# Patient Record
Sex: MALE | Race: BLACK OR AFRICAN AMERICAN | NOT HISPANIC OR LATINO
[De-identification: names, ages, dates, MRNs, and addresses within clinical notes are randomized per-mention and may not be internally consistent; named-entity substitution may affect disease eponyms.]

---

## 2017-10-25 PROBLEM — Z00.00 ENCOUNTER FOR PREVENTIVE HEALTH EXAMINATION: Status: ACTIVE | Noted: 2017-10-25

## 2017-10-26 ENCOUNTER — APPOINTMENT (OUTPATIENT)
Dept: OPHTHALMOLOGY | Facility: CLINIC | Age: 57
End: 2017-10-26
Payer: COMMERCIAL

## 2017-10-26 PROCEDURE — 92014 COMPRE OPH EXAM EST PT 1/>: CPT

## 2017-10-26 PROCEDURE — 92225: CPT | Mod: RT

## 2017-10-26 PROCEDURE — 92015 DETERMINE REFRACTIVE STATE: CPT

## 2017-11-17 ENCOUNTER — APPOINTMENT (OUTPATIENT)
Dept: SLEEP CENTER | Facility: CLINIC | Age: 57
End: 2017-11-17
Payer: COMMERCIAL

## 2017-11-17 PROCEDURE — G0399: CPT | Mod: 26

## 2017-11-19 ENCOUNTER — OUTPATIENT (OUTPATIENT)
Dept: OUTPATIENT SERVICES | Facility: HOSPITAL | Age: 57
LOS: 1 days | End: 2017-11-19
Payer: COMMERCIAL

## 2017-11-19 PROCEDURE — G0399: CPT

## 2017-11-29 DIAGNOSIS — G47.30 SLEEP APNEA, UNSPECIFIED: ICD-10-CM

## 2017-12-05 ENCOUNTER — APPOINTMENT (OUTPATIENT)
Dept: PULMONOLOGY | Facility: CLINIC | Age: 57
End: 2017-12-05
Payer: COMMERCIAL

## 2017-12-05 VITALS
BODY MASS INDEX: 35.31 KG/M2 | HEIGHT: 67 IN | HEART RATE: 84 BPM | WEIGHT: 225 LBS | RESPIRATION RATE: 16 BRPM | SYSTOLIC BLOOD PRESSURE: 150 MMHG | TEMPERATURE: 98.5 F | DIASTOLIC BLOOD PRESSURE: 94 MMHG

## 2017-12-05 DIAGNOSIS — E11.9 TYPE 2 DIABETES MELLITUS W/OUT COMPLICATIONS: ICD-10-CM

## 2017-12-05 DIAGNOSIS — E78.5 HYPERLIPIDEMIA, UNSPECIFIED: ICD-10-CM

## 2017-12-05 DIAGNOSIS — Z80.49 FAMILY HISTORY OF MALIGNANT NEOPLASM OF OTHER GENITAL ORGANS: ICD-10-CM

## 2017-12-05 DIAGNOSIS — Z80.1 FAMILY HISTORY OF MALIGNANT NEOPLASM OF TRACHEA, BRONCHUS AND LUNG: ICD-10-CM

## 2017-12-05 DIAGNOSIS — I10 ESSENTIAL (PRIMARY) HYPERTENSION: ICD-10-CM

## 2017-12-05 DIAGNOSIS — E66.9 OBESITY, UNSPECIFIED: ICD-10-CM

## 2017-12-05 PROCEDURE — 99204 OFFICE O/P NEW MOD 45 MIN: CPT

## 2017-12-05 RX ORDER — ROSUVASTATIN CALCIUM 20 MG/1
20 TABLET, FILM COATED ORAL
Refills: 0 | Status: ACTIVE | COMMUNITY
Start: 2017-12-05

## 2017-12-05 RX ORDER — TELMISARTAN AND HYDROCHLOROTHIAZIDE 40; 12.5 MG/1; MG/1
40-12.5 TABLET ORAL
Refills: 0 | Status: ACTIVE | COMMUNITY
Start: 2017-12-05

## 2017-12-05 RX ORDER — METFORMIN HYDROCHLORIDE 500 MG/1
500 TABLET, COATED ORAL
Qty: 30 | Refills: 0 | Status: ACTIVE | COMMUNITY
Start: 2017-03-06

## 2017-12-05 RX ORDER — POLYETHYLENE GLYCOL 3350, SODIUM CHLORIDE, POTASSIUM CHLORIDE, SODIUM BICARBONATE, AND SODIUM SULFATE 240; 5.84; 2.98; 6.72; 22.72 G/4L; G/4L; G/4L; G/4L; G/4L
240 POWDER, FOR SOLUTION ORAL
Qty: 4000 | Refills: 0 | Status: DISCONTINUED | COMMUNITY
Start: 2017-09-25

## 2017-12-14 ENCOUNTER — APPOINTMENT (OUTPATIENT)
Dept: OPHTHALMOLOGY | Facility: CLINIC | Age: 57
End: 2017-12-14
Payer: COMMERCIAL

## 2017-12-14 PROCEDURE — 92226: CPT | Mod: RT

## 2017-12-14 PROCEDURE — 92014 COMPRE OPH EXAM EST PT 1/>: CPT

## 2018-01-03 ENCOUNTER — APPOINTMENT (OUTPATIENT)
Dept: SLEEP CENTER | Facility: CLINIC | Age: 58
End: 2018-01-03
Payer: COMMERCIAL

## 2018-01-03 PROCEDURE — G0400: CPT | Mod: 26

## 2018-01-05 ENCOUNTER — OUTPATIENT (OUTPATIENT)
Dept: OUTPATIENT SERVICES | Facility: HOSPITAL | Age: 58
LOS: 1 days | End: 2018-01-05
Payer: COMMERCIAL

## 2018-01-05 PROCEDURE — G0400: CPT

## 2018-01-10 ENCOUNTER — RESULT REVIEW (OUTPATIENT)
Age: 58
End: 2018-01-10

## 2018-01-10 DIAGNOSIS — G47.30 SLEEP APNEA, UNSPECIFIED: ICD-10-CM

## 2018-03-05 ENCOUNTER — APPOINTMENT (OUTPATIENT)
Dept: PULMONOLOGY | Facility: CLINIC | Age: 58
End: 2018-03-05
Payer: COMMERCIAL

## 2018-03-05 VITALS
HEART RATE: 92 BPM | TEMPERATURE: 97.2 F | WEIGHT: 226 LBS | HEIGHT: 67 IN | SYSTOLIC BLOOD PRESSURE: 120 MMHG | DIASTOLIC BLOOD PRESSURE: 70 MMHG | BODY MASS INDEX: 35.47 KG/M2 | RESPIRATION RATE: 16 BRPM

## 2018-03-05 PROCEDURE — 99214 OFFICE O/P EST MOD 30 MIN: CPT

## 2018-09-12 ENCOUNTER — APPOINTMENT (OUTPATIENT)
Dept: PULMONOLOGY | Facility: CLINIC | Age: 58
End: 2018-09-12
Payer: COMMERCIAL

## 2018-09-12 VITALS
WEIGHT: 226 LBS | HEIGHT: 67 IN | BODY MASS INDEX: 35.47 KG/M2 | DIASTOLIC BLOOD PRESSURE: 80 MMHG | HEART RATE: 77 BPM | RESPIRATION RATE: 16 BRPM | SYSTOLIC BLOOD PRESSURE: 150 MMHG | OXYGEN SATURATION: 98 %

## 2018-09-12 PROCEDURE — 99214 OFFICE O/P EST MOD 30 MIN: CPT

## 2019-02-04 ENCOUNTER — APPOINTMENT (OUTPATIENT)
Dept: OPHTHALMOLOGY | Facility: CLINIC | Age: 59
End: 2019-02-04
Payer: COMMERCIAL

## 2019-02-04 PROCEDURE — 92225: CPT | Mod: LT

## 2019-02-04 PROCEDURE — 92015 DETERMINE REFRACTIVE STATE: CPT

## 2019-02-04 PROCEDURE — 92014 COMPRE OPH EXAM EST PT 1/>: CPT

## 2019-02-27 ENCOUNTER — APPOINTMENT (OUTPATIENT)
Dept: PULMONOLOGY | Facility: CLINIC | Age: 59
End: 2019-02-27
Payer: COMMERCIAL

## 2019-02-27 VITALS
TEMPERATURE: 97.5 F | RESPIRATION RATE: 16 BRPM | WEIGHT: 218 LBS | HEART RATE: 76 BPM | HEIGHT: 67 IN | OXYGEN SATURATION: 98 % | DIASTOLIC BLOOD PRESSURE: 94 MMHG | SYSTOLIC BLOOD PRESSURE: 159 MMHG | BODY MASS INDEX: 34.21 KG/M2

## 2019-02-27 PROCEDURE — 99214 OFFICE O/P EST MOD 30 MIN: CPT

## 2019-02-27 NOTE — REVIEW OF SYSTEMS
[EDS: ESS=____] : no daytime somnolence [Nasal Congestion] : no nasal congestion [Postnasal Drip] : no postnasal drip [Witnessed Apneas] : no witnessed apnea [Shortness Of Breath] : no shortness of breath [A.M. Dry Mouth] : no a.m. dry mouth [Orthopnea] : no orthopnea [Chest Pain] : no chest pain [Palpitations] : no palpitations [Edema] : ~T edema was not present [CHF] : no congestive heart failure [Thyroid Disease] : no thyroid disease [Anemia] : no anemia [History of Iron Deficiency] : no history of iron deficiency [A.M. Headache] : no headache present upon awakening [Leg Dysesthesias] : no leg dysesthesias [Heartburn] : no heartburn [Arthralgias] : no arthralgias [Fibromyalgia] : no fibromyalgia [Depression] : no depression [Anxious] : not anxious

## 2019-02-27 NOTE — ASSESSMENT
[FreeTextEntry1] : This is a 57 year old male with severe TANGELA here for a follow up visit. Therapeutic and compliance data download reveals usage 100% of days with an average use of 7 hours and 5 minutes. Therapy based AHI is 2.4/hr on 12 - 20 cm H2O, with a 95th percentile pressure of 17.1 cm H2O.  The patient is experiencing benefit from APAP and should continue to use. Compliance data was given to patient. He will follow up in 1 year or sooner if needed.\par \par

## 2019-02-27 NOTE — HISTORY OF PRESENT ILLNESS
[FreeTextEntry1] : This is a 57 year old male [facility]  with severe TANGELA here for a follow up per [facility] requirements.\par \par Initial diagnostic study (11/19/17) showed an MAHIN of 73.4/hr. He has been using APAP on a nightly basis for the duration of the night and feels a benefit from use. He feels less tired during the day and notes improvement in his sleep quality. He states that he looks forward to using his CPAP machine. He continues to work as a . He plans to retire in about 1 year. He is using a full face mask and tolerating without issues. The DME supplier is [employer].\par \par Comorbid medical conditions include hypertension, hyperlipidemia, diabetes, history of UPPP. No interim changes to his health reported.\par \par

## 2023-03-06 ENCOUNTER — NON-APPOINTMENT (OUTPATIENT)
Age: 63
End: 2023-03-06

## 2023-04-09 ENCOUNTER — NON-APPOINTMENT (OUTPATIENT)
Age: 63
End: 2023-04-09

## 2023-04-10 ENCOUNTER — NON-APPOINTMENT (OUTPATIENT)
Age: 63
End: 2023-04-10

## 2023-04-10 ENCOUNTER — APPOINTMENT (OUTPATIENT)
Dept: OPHTHALMOLOGY | Facility: CLINIC | Age: 63
End: 2023-04-10
Payer: COMMERCIAL

## 2023-04-10 PROCEDURE — 92015 DETERMINE REFRACTIVE STATE: CPT

## 2023-04-10 PROCEDURE — 92004 COMPRE OPH EXAM NEW PT 1/>: CPT

## 2023-04-24 ENCOUNTER — APPOINTMENT (OUTPATIENT)
Dept: PULMONOLOGY | Facility: CLINIC | Age: 63
End: 2023-04-24
Payer: COMMERCIAL

## 2023-04-24 VITALS
SYSTOLIC BLOOD PRESSURE: 152 MMHG | DIASTOLIC BLOOD PRESSURE: 80 MMHG | HEIGHT: 67 IN | TEMPERATURE: 97 F | RESPIRATION RATE: 15 BRPM | WEIGHT: 212 LBS | OXYGEN SATURATION: 95 % | HEART RATE: 81 BPM | BODY MASS INDEX: 33.27 KG/M2

## 2023-04-24 DIAGNOSIS — G47.33 OBSTRUCTIVE SLEEP APNEA (ADULT) (PEDIATRIC): ICD-10-CM

## 2023-04-24 PROCEDURE — 99203 OFFICE O/P NEW LOW 30 MIN: CPT

## 2023-04-24 NOTE — REVIEW OF SYSTEMS
[Snoring] : snoring [Obesity] : obesity [Diabetes] : diabetes  [Nocturia] : nocturia [EDS: ESS=____] : no daytime somnolence [Nasal Congestion] : no nasal congestion [Postnasal Drip] : no postnasal drip [Witnessed Apneas] : no witnessed apnea [Shortness Of Breath] : no shortness of breath [A.M. Dry Mouth] : no a.m. dry mouth [Orthopnea] : no orthopnea [Chest Pain] : no chest pain [Palpitations] : no palpitations [Edema] : ~T edema was not present [CHF] : no congestive heart failure [Thyroid Disease] : no thyroid disease [Anemia] : no anemia [History of Iron Deficiency] : no history of iron deficiency [A.M. Headache] : no headache present upon awakening [Leg Dysesthesias] : no leg dysesthesias [Heartburn] : no heartburn [Arthralgias] : no arthralgias [Fibromyalgia] : no fibromyalgia [Depression] : no depression [Anxious] : not anxious

## 2023-04-24 NOTE — ASSESSMENT
[FreeTextEntry1] : This is a 62 year old male with severe TANGELA on CPAP here to reestablish care. Therapeutic and compliance data download reveals usage 100% of days with an average use of 8 hours and 23 minutes. Therapy based AHI is 1.6/hr on 12 - 20 cm H2O, with a 95th percentile pressure of 14.8 cm H2O.  The patient is experiencing benefit from APAP and should continue to use.  Patient was given an [employer] nasal cradle fit pack to trial.  We will place an order for new supplies. He will follow up in 1 year or sooner if needed.\par \par

## 2023-04-24 NOTE — REASON FOR VISIT
[Initial Evaluation] : an initial evaluation [Follow-Up] : a follow-up visit [Sleep Apnea] : sleep apnea

## 2023-04-24 NOTE — ASSESSMENT
[FreeTextEntry1] : This is a 62 year old male with severe TANGELA on CPAP here to reestablish care. Therapeutic and compliance data download reveals usage 100% of days with an average use of 8 hours and 23 minutes. Therapy based AHI is 1.6/hr on 12 - 20 cm H2O, with a 95th percentile pressure of 14.8 cm H2O.  The patient is experiencing benefit from APAP and should continue to use.  Patient was given an [employer] nasal cradle fit pack to trial.  We will place an order for new supplies. He will follow up in 1 year or sooner if needed.\par \par

## 2023-04-24 NOTE — PHYSICAL EXAM
[Apical Impulse] : the apical impulse was normal [Skin Color & Pigmentation] : normal skin color and pigmentation [General Appearance - Well Developed] : well developed [Normal Appearance] : normal appearance [Well Groomed] : well groomed [General Appearance - Well Nourished] : well nourished [No Deformities] : no deformities [General Appearance - In No Acute Distress] : no acute distress [Normal Conjunctiva] : the conjunctiva exhibited no abnormalities [Heart Rate And Rhythm] : heart rate was normal and rhythm regular [Heart Sounds] : normal S1 and S2 [Heart Sounds Gallop] : no gallops [Murmurs] : no murmurs [Respiration, Rhythm And Depth] : normal respiratory rhythm and effort [Exaggerated Use Of Accessory Muscles For Inspiration] : no accessory muscle use [Auscultation Breath Sounds / Voice Sounds] : lungs were clear to auscultation bilaterally [Abnormal Walk] : normal gait [Involuntary Movements] : no involuntary movements were seen [Nail Clubbing] : no clubbing of the fingernails [Cyanosis, Localized] : no localized cyanosis [Petechial Hemorrhages (___cm)] : no petechial hemorrhages [Nail Splinter Hemorrhages] : no splinter hemorrhages of the nails [] : no ischemic changes [No Focal Deficits] : no focal deficits [Oriented To Time, Place, And Person] : oriented to person, place, and time [Impaired Insight] : insight and judgment were intact [Affect] : the affect was normal [Mood] : the mood was normal [I] : I [Neck Appearance] : the appearance of the neck was normal

## 2023-04-24 NOTE — HISTORY OF PRESENT ILLNESS
[FreeTextEntry1] : This is a 62 year old male with severe TANGELA on CPAP, here to re-establish care.\par \par Comorbid medical conditions include hypertension, hyperlipidemia, diabetes, history of UPPP. No interim changes to his health reported. He is a retired [employer] .\par \par Last visit 2/2019.\par \par HST (11/19/17) showed an MAHIN of 73.4/hr.\par \par Device: AirSense 10 Autoset (1/12/2018)\par Settings: APAP 12 - 20 cm H2O\par Mask: Full Face Mask\par DME: [employer] [facility] \par \par He has been using APAP on a nightly basis for the duration of the night and feels a benefit from use.  He states that he cannot sleep without the device.  He is using a full face mask currently purchased out-of-pocket.  Would like to try a nasal mask.  Tolerating the current pressure settings. Lost about 12 lbs since initial diagnostic study.\par

## 2025-05-01 ENCOUNTER — NON-APPOINTMENT (OUTPATIENT)
Age: 65
End: 2025-05-01

## 2025-05-05 ENCOUNTER — APPOINTMENT (OUTPATIENT)
Dept: OPHTHALMOLOGY | Facility: CLINIC | Age: 65
End: 2025-05-05
Payer: MEDICARE

## 2025-05-05 ENCOUNTER — NON-APPOINTMENT (OUTPATIENT)
Age: 65
End: 2025-05-05

## 2025-05-05 PROCEDURE — 92250 FUNDUS PHOTOGRAPHY W/I&R: CPT

## 2025-05-05 PROCEDURE — 92004 COMPRE OPH EXAM NEW PT 1/>: CPT

## 2025-05-08 ENCOUNTER — APPOINTMENT (OUTPATIENT)
Dept: PULMONOLOGY | Facility: CLINIC | Age: 65
End: 2025-05-08

## 2025-05-20 ENCOUNTER — APPOINTMENT (OUTPATIENT)
Dept: PULMONOLOGY | Facility: CLINIC | Age: 65
End: 2025-05-20
Payer: MEDICARE

## 2025-05-20 DIAGNOSIS — G47.33 OBSTRUCTIVE SLEEP APNEA (ADULT) (PEDIATRIC): ICD-10-CM

## 2025-05-20 PROCEDURE — 99213 OFFICE O/P EST LOW 20 MIN: CPT | Mod: 2W
